# Patient Record
Sex: MALE | Race: WHITE | NOT HISPANIC OR LATINO | Employment: FULL TIME | URBAN - METROPOLITAN AREA
[De-identification: names, ages, dates, MRNs, and addresses within clinical notes are randomized per-mention and may not be internally consistent; named-entity substitution may affect disease eponyms.]

---

## 2023-01-01 ENCOUNTER — OFFICE VISIT (OUTPATIENT)
Dept: URGENT CARE | Facility: URGENT CARE | Age: 28
End: 2023-01-01
Payer: COMMERCIAL

## 2023-01-01 VITALS
DIASTOLIC BLOOD PRESSURE: 82 MMHG | SYSTOLIC BLOOD PRESSURE: 119 MMHG | HEART RATE: 86 BPM | OXYGEN SATURATION: 97 % | TEMPERATURE: 99.3 F

## 2023-01-01 DIAGNOSIS — H83.02 ACUTE LABYRINTHITIS, LEFT: Primary | ICD-10-CM

## 2023-01-01 DIAGNOSIS — H90.42 SENSORINEURAL HEARING LOSS (SNHL) OF LEFT EAR WITH UNRESTRICTED HEARING OF RIGHT EAR: ICD-10-CM

## 2023-01-01 LAB — B BURGDOR IGG+IGM SER QL: 0.06

## 2023-01-01 PROCEDURE — 86618 LYME DISEASE ANTIBODY: CPT | Performed by: INTERNAL MEDICINE

## 2023-01-01 PROCEDURE — 99203 OFFICE O/P NEW LOW 30 MIN: CPT | Performed by: INTERNAL MEDICINE

## 2023-01-01 PROCEDURE — 36415 COLL VENOUS BLD VENIPUNCTURE: CPT | Performed by: INTERNAL MEDICINE

## 2023-01-01 ASSESSMENT — PAIN SCALES - GENERAL: PAINLEVEL: NO PAIN (0)

## 2023-08-21 NOTE — PATIENT INSTRUCTIONS
Your signs and symptoms fit well with a condition called acute labyrinthitis or vestibular neuronitis which is a viral infection of the inner ear.  Most of the time, this will gradually improve over the next several weeks.    There is some possibility of secondary Lyme disease causing a cranial nerve palsy (8th cranial nerve) so we will do an antibody test for that.  A negative result is helpful for ruling out Lyme disease.  A positive result would be meaningful only if your symptoms are not continuing to improve.    If you are not significantly better in the coming week, come back for reassessment and potential referral to an ENT specialist.

## 2023-08-21 NOTE — LETTER
August 21, 2023      Barry Linares  [address]  [address]  [place] [address]        To Whom It May Concern:    I have seen Barry JO TorresMilagros at the Urgent Care for an acute episode of vertigo and hearing loss.  Please excuse absence from work over the coming week.  Thank you.      Sincerely,        Duke Sargent MD

## 2023-08-21 NOTE — PROGRESS NOTES
Assessment & Plan     Acute labyrinthitis, left  Patient Instructions   Your signs and symptoms fit well with a condition called acute labyrinthitis or vestibular neuronitis which is a viral infection of the inner ear.  Most of the time, this will gradually improve over the next several weeks.    There is some possibility of secondary Lyme disease causing a cranial nerve palsy (8th cranial nerve) so we will do an antibody test for that.  A negative result is helpful for ruling out Lyme disease.  A positive result would be meaningful only if your symptoms are not continuing to improve.    If you are not significantly better in the coming week, come back for reassessment and potential referral to an ENT specialist.   - Lyme Disease Total Abs Bld with Reflex to Confirm CLIA; Future  - Lyme Disease Total Abs Bld with Reflex to Confirm CLIA    Sensorineural hearing loss (SNHL) of left ear with unrestricted hearing of right ear  - Lyme Disease Total Abs Bld with Reflex to Confirm CLIA; Future  - Lyme Disease Total Abs Bld with Reflex to Confirm CLI    Return in about 1 week (around 8/28/2023), or if symptoms worsen or fail to improve.    Duke Sargent MD  [facility] APRYL Reddy is a 28 year old, presenting for the following health issues:  Urgent Care (Pt states that he has had left ear hearing loss, vertigo, nausea, vomiting for 3 days, head feels wobbly. Pt on current medications that need to be put into med list here.)    HPI   Chief complaint of vertigo.  This happened acutely a few days ago.  Associated nausea, vomiting.  Was prescribed ondansetron and meclizine.  Continues to experience some vertigo with superior gaze or head movement.  Can suppress the vertigo with staying still.  He notes hearing loss on the left side. Denies ear pain on the left side.  Denies ear pressure.  Noting some tinnitus -- he does have some tendency to tinnitus so can't tell if the current tinnitus  is distinct from baseline.  He did have some intense tinnitus initially but this has resolved.     Review of Systems   Constitutional, HEENT, cardiovascular, pulmonary, gi and gu systems are negative, except as otherwise noted.      Objective    /82 (BP Location: Right arm, Patient Position: Sitting, Cuff Size: Adult Large)   Pulse 86   Temp 99.3  F (37.4  C) (Oral)   SpO2 97%   There is no height or weight on file to calculate BMI.  Physical Exam   GENERAL APPEARANCE: alert and no distress  EYES: Eyes grossly normal to inspection, PERRL, and mild nystagmus with left-directed lateral gaze; no vertical nystagmus  HENT: ear canals and TM's normal and nose and mouth without ulcers or lesions  NECK: no adenopathy and no asymmetry, masses, or scars  NEURO: CN 2-12 intact with the exception of left CN 8 that shows diminished hearing on the left side; Doshi lateralizes to the contralateral ear and Rinne shows AC > BC

## 2024-01-01 ENCOUNTER — APPOINTMENT (OUTPATIENT)
Dept: CT IMAGING | Facility: CLINIC | Age: 29
End: 2024-01-01
Attending: EMERGENCY MEDICINE
Payer: COMMERCIAL

## 2024-01-01 ENCOUNTER — OFFICE VISIT (OUTPATIENT)
Dept: URGENT CARE | Facility: URGENT CARE | Age: 29
End: 2024-01-01
Payer: COMMERCIAL

## 2024-01-01 ENCOUNTER — HOSPITAL ENCOUNTER (EMERGENCY)
Facility: CLINIC | Age: 29
Discharge: HOME | End: 2024-07-16
Attending: EMERGENCY MEDICINE | Admitting: EMERGENCY MEDICINE
Payer: COMMERCIAL

## 2024-01-01 ENCOUNTER — APPOINTMENT (OUTPATIENT)
Dept: GENERAL RADIOLOGY | Facility: CLINIC | Age: 29
End: 2024-01-01
Attending: EMERGENCY MEDICINE
Payer: COMMERCIAL

## 2024-01-01 ENCOUNTER — HOSPITAL ENCOUNTER (EMERGENCY)
Facility: CLINIC | Age: 29
Discharge: HOME | End: 2024-07-27
Attending: EMERGENCY MEDICINE | Admitting: EMERGENCY MEDICINE
Payer: COMMERCIAL

## 2024-01-01 VITALS
TEMPERATURE: 97.6 F | BODY MASS INDEX: 39.07 KG/M2 | HEART RATE: 115 BPM | HEIGHT: 67 IN | OXYGEN SATURATION: 98 % | RESPIRATION RATE: 18 BRPM | DIASTOLIC BLOOD PRESSURE: 90 MMHG | WEIGHT: 248.9 LBS | SYSTOLIC BLOOD PRESSURE: 137 MMHG

## 2024-01-01 VITALS
SYSTOLIC BLOOD PRESSURE: 121 MMHG | HEART RATE: 123 BPM | TEMPERATURE: 103.3 F | WEIGHT: 258.3 LBS | DIASTOLIC BLOOD PRESSURE: 83 MMHG | OXYGEN SATURATION: 98 %

## 2024-01-01 VITALS
OXYGEN SATURATION: 96 % | HEART RATE: 89 BPM | BODY MASS INDEX: 40.8 KG/M2 | DIASTOLIC BLOOD PRESSURE: 112 MMHG | TEMPERATURE: 100 F | SYSTOLIC BLOOD PRESSURE: 124 MMHG | WEIGHT: 259.92 LBS | HEIGHT: 67 IN | RESPIRATION RATE: 20 BRPM

## 2024-01-01 DIAGNOSIS — K29.80 DUODENITIS: ICD-10-CM

## 2024-01-01 DIAGNOSIS — R51.9 NONINTRACTABLE HEADACHE, UNSPECIFIED CHRONICITY PATTERN, UNSPECIFIED HEADACHE TYPE: ICD-10-CM

## 2024-01-01 DIAGNOSIS — R50.9 FEVER AND CHILLS: Primary | ICD-10-CM

## 2024-01-01 DIAGNOSIS — A87.9 VIRAL MENINGITIS: ICD-10-CM

## 2024-01-01 DIAGNOSIS — T39.395A NSAID INDUCED GASTRITIS: ICD-10-CM

## 2024-01-01 DIAGNOSIS — K29.60 NSAID INDUCED GASTRITIS: ICD-10-CM

## 2024-01-01 DIAGNOSIS — M79.10 MYALGIA: ICD-10-CM

## 2024-01-01 LAB
ALBUMIN SERPL BCG-MCNC: 4.4 G/DL (ref 3.5–5.2)
ALBUMIN SERPL BCG-MCNC: 4.6 G/DL (ref 3.5–5.2)
ALBUMIN UR-MCNC: 70 MG/DL
ALP SERPL-CCNC: 119 U/L (ref 40–150)
ALP SERPL-CCNC: 154 U/L (ref 40–150)
ALT SERPL W P-5'-P-CCNC: 39 U/L (ref 0–70)
ALT SERPL W P-5'-P-CCNC: 65 U/L (ref 0–70)
ANION GAP SERPL CALCULATED.3IONS-SCNC: 14 MMOL/L (ref 7–15)
ANION GAP SERPL CALCULATED.3IONS-SCNC: 18 MMOL/L (ref 3–14)
ANION GAP SERPL CALCULATED.3IONS-SCNC: 18 MMOL/L (ref 7–15)
APPEARANCE CSF: CLEAR
APPEARANCE UR: CLEAR
AST SERPL W P-5'-P-CCNC: 28 U/L (ref 0–45)
AST SERPL W P-5'-P-CCNC: 55 U/L (ref 0–45)
B BURGDOR IGG CSF QL IB: NEGATIVE
B BURGDOR IGG+IGM SER QL: 0.16
B BURGDOR IGM CSF QL IB: NEGATIVE
BACTERIA BLD CULT: NO GROWTH
BACTERIA BLD CULT: NO GROWTH
BACTERIA CSF CULT: NO GROWTH
BACTERIA CSF CULT: NORMAL
BASOPHILS # BLD AUTO: 0 10E3/UL (ref 0–0.2)
BASOPHILS # BLD AUTO: 0.1 10E3/UL (ref 0–0.2)
BASOPHILS # BLD AUTO: 0.1 10E3/UL (ref 0–0.2)
BASOPHILS NFR BLD AUTO: 0 %
BASOPHILS NFR BLD AUTO: 1 %
BASOPHILS NFR BLD AUTO: 1 %
BILIRUB DIRECT SERPL-MCNC: <0.2 MG/DL (ref 0–0.3)
BILIRUB SERPL-MCNC: 0.4 MG/DL
BILIRUB SERPL-MCNC: 0.8 MG/DL
BILIRUB UR QL STRIP: NEGATIVE
BUN SERPL-MCNC: 10.8 MG/DL (ref 6–20)
BUN SERPL-MCNC: 14.5 MG/DL (ref 6–20)
BUN SERPL-MCNC: 15 MG/DL (ref 7–30)
C GATTII+NEOFOR DNA CSF QL NAA+NON-PROBE: NEGATIVE
CALCIUM SERPL-MCNC: 10.1 MG/DL (ref 8.8–10.4)
CALCIUM SERPL-MCNC: 9.7 MG/DL (ref 8.5–10.1)
CALCIUM SERPL-MCNC: 9.7 MG/DL (ref 8.6–10)
CHLORIDE BLD-SCNC: 93 MMOL/L (ref 94–109)
CHLORIDE SERPL-SCNC: 93 MMOL/L (ref 98–107)
CHLORIDE SERPL-SCNC: 99 MMOL/L (ref 98–107)
CMV DNA CSF QL NAA+NON-PROBE: NEGATIVE
CO2 SERPL-SCNC: 20 MMOL/L (ref 20–32)
COLOR CSF: COLORLESS
COLOR UR AUTO: ABNORMAL
CREAT SERPL-MCNC: 1.16 MG/DL (ref 0.67–1.17)
CREAT SERPL-MCNC: 1.22 MG/DL (ref 0.66–1.25)
CREAT SERPL-MCNC: 1.22 MG/DL (ref 0.67–1.17)
E COLI K1 AG CSF QL: NEGATIVE
EGFRCR SERPLBLD CKD-EPI 2021: 81 ML/MIN/1.73M2
EGFRCR SERPLBLD CKD-EPI 2021: 81 ML/MIN/1.73M2
EGFRCR SERPLBLD CKD-EPI 2021: 86 ML/MIN/1.73M2
EOSINOPHIL # BLD AUTO: 0 10E3/UL (ref 0–0.7)
EOSINOPHIL # BLD AUTO: 0 10E3/UL (ref 0–0.7)
EOSINOPHIL # BLD AUTO: 0.1 10E3/UL (ref 0–0.7)
EOSINOPHIL NFR BLD AUTO: 0 %
EOSINOPHIL NFR BLD AUTO: 0 %
EOSINOPHIL NFR BLD AUTO: 1 %
ERYTHROCYTE [DISTWIDTH] IN BLOOD BY AUTOMATED COUNT: 13.9 % (ref 10–15)
ERYTHROCYTE [DISTWIDTH] IN BLOOD BY AUTOMATED COUNT: 14.1 % (ref 10–15)
ERYTHROCYTE [DISTWIDTH] IN BLOOD BY AUTOMATED COUNT: 14.2 % (ref 10–15)
EV RNA SPEC QL NAA+PROBE: NEGATIVE
FLUAV AG SPEC QL IA: NEGATIVE
FLUAV RNA SPEC QL NAA+PROBE: NEGATIVE
FLUBV AG SPEC QL IA: NEGATIVE
FLUBV RNA RESP QL NAA+PROBE: NEGATIVE
GLUCOSE BLD-MCNC: 96 MG/DL (ref 70–99)
GLUCOSE CSF-MCNC: 59 MG/DL (ref 40–70)
GLUCOSE SERPL-MCNC: 106 MG/DL (ref 70–99)
GLUCOSE SERPL-MCNC: 96 MG/DL (ref 70–99)
GLUCOSE UR STRIP-MCNC: 30 MG/DL
GP B STREP DNA CSF QL NAA+NON-PROBE: NEGATIVE
GRAM STAIN RESULT: NORMAL
GRAM STAIN RESULT: NORMAL
GROUP A STREP BY PCR: NOT DETECTED
HAEM INFLU DNA CSF QL NAA+NON-PROBE: NEGATIVE
HCO3 SERPL-SCNC: 20 MMOL/L (ref 22–29)
HCO3 SERPL-SCNC: 23 MMOL/L (ref 22–29)
HCT VFR BLD AUTO: 51 % (ref 40–53)
HCT VFR BLD AUTO: 52.6 % (ref 40–53)
HCT VFR BLD AUTO: 54.2 % (ref 40–53)
HGB BLD-MCNC: 16.8 G/DL (ref 13.3–17.7)
HGB BLD-MCNC: 17.3 G/DL (ref 13.3–17.7)
HGB BLD-MCNC: 17.9 G/DL (ref 13.3–17.7)
HGB UR QL STRIP: ABNORMAL
HHV6 DNA CSF QL NAA+NON-PROBE: POSITIVE
HOLD SPECIMEN: NORMAL
HSV1 DNA CSF QL NAA+NON-PROBE: NEGATIVE
HSV2 DNA CSF QL NAA+NON-PROBE: NEGATIVE
IMM GRANULOCYTES # BLD: 0.1 10E3/UL
IMM GRANULOCYTES NFR BLD: 1 %
KETONES UR STRIP-MCNC: 40 MG/DL
L MONOCYTOG DNA CSF QL NAA+NON-PROBE: NEGATIVE
LACTATE SERPL-SCNC: 1.7 MMOL/L (ref 0.7–2)
LEUKOCYTE ESTERASE UR QL STRIP: NEGATIVE
LIPASE SERPL-CCNC: 26 U/L (ref 13–60)
LYMPH ABN NFR CSF MANUAL: 76 %
LYMPHOCYTES # BLD AUTO: 0.9 10E3/UL (ref 0.8–5.3)
LYMPHOCYTES # BLD AUTO: 1.2 10E3/UL (ref 0.8–5.3)
LYMPHOCYTES # BLD AUTO: 3.1 10E3/UL (ref 0.8–5.3)
LYMPHOCYTES NFR BLD AUTO: 11 %
LYMPHOCYTES NFR BLD AUTO: 16 %
LYMPHOCYTES NFR BLD AUTO: 20 %
MCH RBC QN AUTO: 29.6 PG (ref 26.5–33)
MCH RBC QN AUTO: 29.8 PG (ref 26.5–33)
MCH RBC QN AUTO: 29.9 PG (ref 26.5–33)
MCHC RBC AUTO-ENTMCNC: 32.9 G/DL (ref 31.5–36.5)
MCHC RBC AUTO-ENTMCNC: 32.9 G/DL (ref 31.5–36.5)
MCHC RBC AUTO-ENTMCNC: 33 G/DL (ref 31.5–36.5)
MCV RBC AUTO: 90 FL (ref 78–100)
MCV RBC AUTO: 90 FL (ref 78–100)
MCV RBC AUTO: 91 FL (ref 78–100)
MONOCYTES # BLD AUTO: 0.5 10E3/UL (ref 0–1.3)
MONOCYTES # BLD AUTO: 0.8 10E3/UL (ref 0–1.3)
MONOCYTES # BLD AUTO: 1.4 10E3/UL (ref 0–1.3)
MONOCYTES NFR BLD AUTO: 7 %
MONOCYTES NFR BLD AUTO: 9 %
MONOCYTES NFR BLD AUTO: 9 %
MONOS+MACROS NFR CSF MANUAL: 11 %
MUCOUS THREADS #/AREA URNS LPF: PRESENT /LPF
N MEN DNA CSF QL NAA+NON-PROBE: NEGATIVE
NEUTROPHILS # BLD AUTO: 10.3 10E3/UL (ref 1.6–8.3)
NEUTROPHILS # BLD AUTO: 5.6 10E3/UL (ref 1.6–8.3)
NEUTROPHILS # BLD AUTO: 6.8 10E3/UL (ref 1.6–8.3)
NEUTROPHILS NFR BLD AUTO: 69 %
NEUTROPHILS NFR BLD AUTO: 75 %
NEUTROPHILS NFR BLD AUTO: 80 %
NEUTROPHILS NFR CSF MANUAL: 13 %
NITRATE UR QL: NEGATIVE
NRBC # BLD AUTO: 0 10E3/UL
NRBC # BLD AUTO: 0 10E3/UL
NRBC BLD AUTO-RTO: 0 /100
NRBC BLD AUTO-RTO: 0 /100
PARECHOVIRUS A RNA CSF QL NAA+NON-PROBE: NEGATIVE
PH UR STRIP: 6.5 [PH] (ref 5–7)
PLAT MORPH BLD: NORMAL
PLATELET # BLD AUTO: 265 10E3/UL (ref 150–450)
PLATELET # BLD AUTO: 282 10E3/UL (ref 150–450)
PLATELET # BLD AUTO: 556 10E3/UL (ref 150–450)
POTASSIUM BLD-SCNC: 3.9 MMOL/L (ref 3.4–5.3)
POTASSIUM SERPL-SCNC: 3.9 MMOL/L (ref 3.4–5.3)
POTASSIUM SERPL-SCNC: 4.2 MMOL/L (ref 3.4–5.3)
PROT CSF-MCNC: 20.4 MG/DL (ref 15–45)
PROT SERPL-MCNC: 8.2 G/DL (ref 6.4–8.3)
PROT SERPL-MCNC: 8.2 G/DL (ref 6.4–8.3)
RBC # BLD AUTO: 5.67 10E6/UL (ref 4.4–5.9)
RBC # BLD AUTO: 5.78 10E6/UL (ref 4.4–5.9)
RBC # BLD AUTO: 6.01 10E6/UL (ref 4.4–5.9)
RBC # CSF MANUAL: 2 /UL (ref 0–2)
RBC MORPH BLD: NORMAL
RBC URINE: 4 /HPF
RSV RNA SPEC NAA+PROBE: NEGATIVE
S PNEUM DNA CSF QL NAA+NON-PROBE: NEGATIVE
SARS-COV-2 RNA RESP QL NAA+PROBE: NEGATIVE
SODIUM SERPL-SCNC: 131 MMOL/L (ref 135–145)
SODIUM SERPL-SCNC: 131 MMOL/L (ref 135–145)
SODIUM SERPL-SCNC: 136 MMOL/L (ref 135–145)
SP GR UR STRIP: 1.01 (ref 1–1.03)
SQUAMOUS EPITHELIAL: <1 /HPF
TUBE # CSF: 4
UROBILINOGEN UR STRIP-MCNC: NORMAL MG/DL
VZV DNA CSF QL NAA+NON-PROBE: NEGATIVE
WBC # BLD AUTO: 15 10E3/UL (ref 4–11)
WBC # BLD AUTO: 7.5 10E3/UL (ref 4–11)
WBC # BLD AUTO: 8.6 10E3/UL (ref 4–11)
WBC # CSF MANUAL: 19 /UL (ref 0–5)
WBC URINE: 2 /HPF

## 2024-01-01 PROCEDURE — 36415 COLL VENOUS BLD VENIPUNCTURE: CPT | Performed by: EMERGENCY MEDICINE

## 2024-01-01 PROCEDURE — 250N000013 HC RX MED GY IP 250 OP 250 PS 637: Performed by: EMERGENCY MEDICINE

## 2024-01-01 PROCEDURE — 99214 OFFICE O/P EST MOD 30 MIN: CPT | Performed by: FAMILY MEDICINE

## 2024-01-01 PROCEDURE — 96361 HYDRATE IV INFUSION ADD-ON: CPT

## 2024-01-01 PROCEDURE — 36415 COLL VENOUS BLD VENIPUNCTURE: CPT | Performed by: FAMILY MEDICINE

## 2024-01-01 PROCEDURE — 84157 ASSAY OF PROTEIN OTHER: CPT | Performed by: EMERGENCY MEDICINE

## 2024-01-01 PROCEDURE — 250N000011 HC RX IP 250 OP 636: Performed by: EMERGENCY MEDICINE

## 2024-01-01 PROCEDURE — 74177 CT ABD & PELVIS W/CONTRAST: CPT

## 2024-01-01 PROCEDURE — 85025 COMPLETE CBC W/AUTO DIFF WBC: CPT | Performed by: FAMILY MEDICINE

## 2024-01-01 PROCEDURE — 36415 COLL VENOUS BLD VENIPUNCTURE: CPT | Performed by: STUDENT IN AN ORGANIZED HEALTH CARE EDUCATION/TRAINING PROGRAM

## 2024-01-01 PROCEDURE — 80053 COMPREHEN METABOLIC PANEL: CPT | Performed by: EMERGENCY MEDICINE

## 2024-01-01 PROCEDURE — 99285 EMERGENCY DEPT VISIT HI MDM: CPT | Mod: 25

## 2024-01-01 PROCEDURE — 96374 THER/PROPH/DIAG INJ IV PUSH: CPT | Mod: 59

## 2024-01-01 PROCEDURE — 87651 STREP A DNA AMP PROBE: CPT | Performed by: EMERGENCY MEDICINE

## 2024-01-01 PROCEDURE — 87070 CULTURE OTHR SPECIMN AEROBIC: CPT | Mod: XS | Performed by: EMERGENCY MEDICINE

## 2024-01-01 PROCEDURE — 87483 CNS DNA AMP PROBE TYPE 12-25: CPT | Performed by: EMERGENCY MEDICINE

## 2024-01-01 PROCEDURE — 82248 BILIRUBIN DIRECT: CPT | Performed by: EMERGENCY MEDICINE

## 2024-01-01 PROCEDURE — 83690 ASSAY OF LIPASE: CPT | Performed by: EMERGENCY MEDICINE

## 2024-01-01 PROCEDURE — 96375 TX/PRO/DX INJ NEW DRUG ADDON: CPT

## 2024-01-01 PROCEDURE — 87075 CULTR BACTERIA EXCEPT BLOOD: CPT | Mod: XS | Performed by: EMERGENCY MEDICINE

## 2024-01-01 PROCEDURE — 250N000013 HC RX MED GY IP 250 OP 250 PS 637: Performed by: STUDENT IN AN ORGANIZED HEALTH CARE EDUCATION/TRAINING PROGRAM

## 2024-01-01 PROCEDURE — 85025 COMPLETE CBC W/AUTO DIFF WBC: CPT | Performed by: EMERGENCY MEDICINE

## 2024-01-01 PROCEDURE — 86617 LYME DISEASE ANTIBODY: CPT | Performed by: EMERGENCY MEDICINE

## 2024-01-01 PROCEDURE — 258N000003 HC RX IP 258 OP 636: Performed by: EMERGENCY MEDICINE

## 2024-01-01 PROCEDURE — 70450 CT HEAD/BRAIN W/O DYE: CPT

## 2024-01-01 PROCEDURE — 80048 BASIC METABOLIC PNL TOTAL CA: CPT | Performed by: EMERGENCY MEDICINE

## 2024-01-01 PROCEDURE — 99291 CRITICAL CARE FIRST HOUR: CPT | Mod: 25

## 2024-01-01 PROCEDURE — 87205 SMEAR GRAM STAIN: CPT | Performed by: EMERGENCY MEDICINE

## 2024-01-01 PROCEDURE — 87804 INFLUENZA ASSAY W/OPTIC: CPT | Performed by: FAMILY MEDICINE

## 2024-01-01 PROCEDURE — 82945 GLUCOSE OTHER FLUID: CPT | Performed by: EMERGENCY MEDICINE

## 2024-01-01 PROCEDURE — 71046 X-RAY EXAM CHEST 2 VIEWS: CPT

## 2024-01-01 PROCEDURE — 87637 SARSCOV2&INF A&B&RSV AMP PRB: CPT | Performed by: EMERGENCY MEDICINE

## 2024-01-01 PROCEDURE — 86618 LYME DISEASE ANTIBODY: CPT | Performed by: EMERGENCY MEDICINE

## 2024-01-01 PROCEDURE — 62270 DX LMBR SPI PNXR: CPT

## 2024-01-01 PROCEDURE — 89051 BODY FLUID CELL COUNT: CPT | Performed by: EMERGENCY MEDICINE

## 2024-01-01 PROCEDURE — 83605 ASSAY OF LACTIC ACID: CPT | Performed by: EMERGENCY MEDICINE

## 2024-01-01 PROCEDURE — 81001 URINALYSIS AUTO W/SCOPE: CPT | Performed by: EMERGENCY MEDICINE

## 2024-01-01 PROCEDURE — 87040 BLOOD CULTURE FOR BACTERIA: CPT | Mod: XS | Performed by: EMERGENCY MEDICINE

## 2024-01-01 PROCEDURE — 80048 BASIC METABOLIC PNL TOTAL CA: CPT | Performed by: FAMILY MEDICINE

## 2024-01-01 PROCEDURE — 96360 HYDRATION IV INFUSION INIT: CPT

## 2024-01-01 PROCEDURE — 83605 ASSAY OF LACTIC ACID: CPT | Performed by: STUDENT IN AN ORGANIZED HEALTH CARE EDUCATION/TRAINING PROGRAM

## 2024-01-01 RX ORDER — PANTOPRAZOLE SODIUM 40 MG/1
40 TABLET, DELAYED RELEASE ORAL DAILY
Qty: 30 TABLET | Refills: 0 | Status: SHIPPED | OUTPATIENT
Start: 2024-01-01

## 2024-01-01 RX ORDER — ONDANSETRON 2 MG/ML
4 INJECTION INTRAMUSCULAR; INTRAVENOUS EVERY 30 MIN PRN
Status: DISCONTINUED | OUTPATIENT
Start: 2024-01-01 | End: 2024-01-01 | Stop reason: HOSPADM

## 2024-01-01 RX ORDER — MOLNUPIRAVIR 200 MG/1
CAPSULE ORAL
COMMUNITY
Start: 2023-01-01

## 2024-01-01 RX ORDER — ACETAMINOPHEN 500 MG
1000 TABLET ORAL ONCE
Status: COMPLETED | OUTPATIENT
Start: 2024-01-01 | End: 2024-01-01

## 2024-01-01 RX ORDER — SUCRALFATE ORAL 1 G/10ML
1 SUSPENSION ORAL 4 TIMES DAILY
Qty: 420 ML | Refills: 0 | Status: SHIPPED | OUTPATIENT
Start: 2024-01-01

## 2024-01-01 RX ORDER — TESTOSTERONE CYPIONATE 200 MG/ML
INJECTION, SOLUTION INTRAMUSCULAR
COMMUNITY
Start: 2024-01-01

## 2024-01-01 RX ORDER — CHOLECALCIFEROL (VITAMIN D3) 10(400)/ML
DROPS ORAL
COMMUNITY

## 2024-01-01 RX ORDER — IOPAMIDOL 755 MG/ML
500 INJECTION, SOLUTION INTRAVASCULAR ONCE
Status: COMPLETED | OUTPATIENT
Start: 2024-01-01 | End: 2024-01-01

## 2024-01-01 RX ORDER — BUPROPION HYDROCHLORIDE 150 MG/1
TABLET ORAL
COMMUNITY
Start: 2024-01-01

## 2024-01-01 RX ORDER — ACETAMINOPHEN 500 MG
500 TABLET ORAL ONCE
Status: COMPLETED | OUTPATIENT
Start: 2024-01-01 | End: 2024-01-01

## 2024-01-01 RX ORDER — IBUPROFEN 200 MG
400 TABLET ORAL ONCE
Status: COMPLETED | OUTPATIENT
Start: 2024-01-01 | End: 2024-01-01

## 2024-01-01 RX ORDER — ONDANSETRON 4 MG/1
4 TABLET, ORALLY DISINTEGRATING ORAL EVERY 8 HOURS PRN
Qty: 10 TABLET | Refills: 0 | Status: SHIPPED | OUTPATIENT
Start: 2024-01-01 | End: 2024-01-01

## 2024-01-01 RX ADMIN — IOPAMIDOL 100 ML: 755 INJECTION, SOLUTION INTRAVENOUS at 01:51

## 2024-01-01 RX ADMIN — PANTOPRAZOLE SODIUM 40 MG: 40 INJECTION, POWDER, FOR SOLUTION INTRAVENOUS at 03:09

## 2024-01-01 RX ADMIN — IBUPROFEN 400 MG: 200 TABLET, FILM COATED ORAL at 16:22

## 2024-01-01 RX ADMIN — SODIUM CHLORIDE 1000 ML: 9 INJECTION, SOLUTION INTRAVENOUS at 03:01

## 2024-01-01 RX ADMIN — Medication 500 MG: at 15:31

## 2024-01-01 RX ADMIN — SODIUM CHLORIDE 1000 ML: 9 INJECTION, SOLUTION INTRAVENOUS at 21:37

## 2024-01-01 RX ADMIN — SODIUM CHLORIDE 3537 ML: 9 INJECTION, SOLUTION INTRAVENOUS at 18:48

## 2024-01-01 RX ADMIN — ONDANSETRON 4 MG: 2 INJECTION INTRAMUSCULAR; INTRAVENOUS at 03:08

## 2024-01-01 RX ADMIN — ACETAMINOPHEN 1000 MG: 500 TABLET, FILM COATED ORAL at 21:37

## 2024-01-01 ASSESSMENT — COLUMBIA-SUICIDE SEVERITY RATING SCALE - C-SSRS
1. IN THE PAST MONTH, HAVE YOU WISHED YOU WERE DEAD OR WISHED YOU COULD GO TO SLEEP AND NOT WAKE UP?: NO
6. HAVE YOU EVER DONE ANYTHING, STARTED TO DO ANYTHING, OR PREPARED TO DO ANYTHING TO END YOUR LIFE?: NO
6. HAVE YOU EVER DONE ANYTHING, STARTED TO DO ANYTHING, OR PREPARED TO DO ANYTHING TO END YOUR LIFE?: NO
2. HAVE YOU ACTUALLY HAD ANY THOUGHTS OF KILLING YOURSELF IN THE PAST MONTH?: NO
1. IN THE PAST MONTH, HAVE YOU WISHED YOU WERE DEAD OR WISHED YOU COULD GO TO SLEEP AND NOT WAKE UP?: NO
2. HAVE YOU ACTUALLY HAD ANY THOUGHTS OF KILLING YOURSELF IN THE PAST MONTH?: NO

## 2024-01-01 ASSESSMENT — ACTIVITIES OF DAILY LIVING (ADL)
ADLS_ACUITY_SCORE: 35
ADLS_ACUITY_SCORE: 33
ADLS_ACUITY_SCORE: 35
ADLS_ACUITY_SCORE: 33
ADLS_ACUITY_SCORE: 33
ADLS_ACUITY_SCORE: 35

## 2024-07-15 NOTE — PATIENT INSTRUCTIONS
[facility] Emergency Department      Address: [address], [place] [address]    Phone: [phone]

## 2024-07-15 NOTE — ED TRIAGE NOTES
Patient ambulatory to triage after being referred here for spinal tap/meningitis workup. Patient seen at  in [place] for headache, fever, neck pain for 4 days. Tylenol last given at 1530.

## 2024-07-15 NOTE — ED PROVIDER NOTES
Emergency Department Note      History of Present Illness     Chief Complaint   Headache      HPI   Barry Linares is a 29 year old male with a history of Hodgkin's lymphoma who presents to the ED from urgent care for evaluation of a headache. The patient states he developed a mild headache 5 days ago that has been constant, waxing and waning in severity, and worsening with time. Four days ago, he noted a fever of 101F, diaphoresis, and intermittent chills that have been also worsening with time. States he tested negative for COVID and Influenza A and B in clinic 3 days ago. Today he also has neck and back pain as well as nausea when the headache is severe. No vomiting. He has been attempting to treat his symptoms with Tylenol and ibuprofen with no relief. He was seen in urgent care today and was sent here for possible meningitis workup. Notes he takes testosterone intramuscularly once per week. Reports a history of Hodgkin's lymphoma, in remission for over 10 years, with multiple cervical and groin surgeries and biopsies done. Denies cough, rhinorrhea, pharyngitis, dysuria, hematuria, rash, abdominal pain, or diarrhea.  No chest pain.  No pleuritic symptoms.    Review of External Notes   Urgent care notes reviewed from earlier today when the patient was seen for febrile illness and referred to the ED for further workup.    Past Medical History     Medical History and Problem List   Depression  Anxiety  Gender dysphoria  Hodgkin's lymphoma    Medications   Wellbutrin  Meclizine  Ondansetron  Lagevrio  Depotestosterone     Surgical History   Appendectomy  Herniorrhaphy  Hysterectomy  Lymph node biopsy  Bone marrow transplant     Physical Exam     Patient Vitals for the past 24 hrs:   BP Temp Temp src Pulse Resp SpO2 Height Weight   07/15/24 2227 -- -- -- -- 20 98 % -- --   07/15/24 2226 -- -- -- 103 21 98 % -- --   07/15/24 2225 -- -- -- 102 22 98 % -- --   07/15/24 2223 -- -- -- 101 22 98 % -- --   07/15/24 2222 --  "-- -- 104 28 98 % -- --   07/15/24 2221 -- -- -- 99 26 96 % -- --   07/15/24 2220 -- -- -- 99 25 97 % -- --   07/15/24 2219 -- -- -- 99 24 97 % -- --   07/15/24 2202 -- -- -- 98 13 98 % -- --   07/15/24 2200 (!) 144/90 -- -- 98 -- 96 % -- --   07/15/24 2132 -- -- -- 99 17 95 % -- --   07/15/24 2117 -- -- -- 112 28 98 % -- --   07/15/24 2102 (!) 142/84 -- -- 105 27 97 % -- --   07/15/24 2047 -- -- -- 102 26 98 % -- --   07/15/24 2032 (!) 142/91 -- -- 104 24 98 % -- --   07/15/24 1900 (!) 144/92 -- -- 101 23 99 % -- --   07/15/24 1858 -- 100  F (37.8  C) Oral -- -- -- -- --   07/15/24 1841 -- -- -- 103 29 -- -- --   07/15/24 1831 -- -- -- 101 -- -- -- --   07/15/24 1821 -- -- -- 103 22 -- -- --   07/15/24 1818 -- -- -- 105 -- 96 % -- --   07/15/24 1817 -- -- -- 107 23 97 % -- --   07/15/24 1816 -- -- -- 107 18 96 % -- --   07/15/24 1815 -- -- -- 108 -- 97 % -- --   07/15/24 1813 (!) 140/113 -- -- 108 30 97 % -- --   07/15/24 1620 (!) 142/92 (!) 102.8  F (39.3  C) -- (!) 124 16 97 % 1.702 m (5' 7\") 117.9 kg (259 lb 14.8 oz)     Physical Exam  Constitutional:       General: He is not in acute distress.     Appearance: Normal appearance. He is not toxic-appearing.   HENT:      Head: Atraumatic.      Right Ear: Tympanic membrane, ear canal and external ear normal.      Left Ear: Tympanic membrane, ear canal and external ear normal.      Nose: Nose normal.      Mouth/Throat:      Pharynx: No oropharyngeal exudate.      Comments: Mild erythema.  No swelling.  Uvula midline.  No evidence of retropharyngeal or peritonsillar abscess.  Eyes:      General: No scleral icterus.     Conjunctiva/sclera: Conjunctivae normal.   Cardiovascular:      Rate and Rhythm: Normal rate.      Heart sounds: Normal heart sounds.   Pulmonary:      Effort: Pulmonary effort is normal. No respiratory distress.      Breath sounds: Normal breath sounds.   Abdominal:      Palpations: Abdomen is soft.      Tenderness: There is no abdominal tenderness. "   Musculoskeletal:         General: No deformity.      Cervical back: Neck supple. No rigidity.      Right lower leg: No edema.      Left lower leg: No edema.   Skin:     General: Skin is warm.      Capillary Refill: Capillary refill takes less than 2 seconds.      Findings: No rash.   Neurological:      General: No focal deficit present.      Mental Status: He is alert and oriented to person, place, and time.   Psychiatric:         Mood and Affect: Mood normal.         Behavior: Behavior normal.           Diagnostics     Lab Results   Labs Ordered and Resulted from Time of ED Arrival to Time of ED Departure   COMPREHENSIVE METABOLIC PANEL - Abnormal       Result Value    Sodium 131 (*)     Potassium 3.9      Carbon Dioxide (CO2) 20 (*)     Anion Gap 18 (*)     Urea Nitrogen 14.5      Creatinine 1.22 (*)     GFR Estimate 81      Calcium 9.7      Chloride 93 (*)     Glucose 96      Alkaline Phosphatase 154 (*)     AST 55 (*)     ALT 65      Protein Total 8.2      Albumin 4.4      Bilirubin Total 0.4     CBC WITH PLATELETS AND DIFFERENTIAL - Abnormal    WBC Count 7.5      RBC Count 6.01 (*)     Hemoglobin 17.9 (*)     Hematocrit 54.2 (*)     MCV 90      MCH 29.8      MCHC 33.0      RDW 14.2      Platelet Count 282      % Neutrophils 75      % Lymphocytes 16      % Monocytes 7      % Eosinophils 0      % Basophils 1      % Immature Granulocytes 1      NRBCs per 100 WBC 0      Absolute Neutrophils 5.6      Absolute Lymphocytes 1.2      Absolute Monocytes 0.5      Absolute Eosinophils 0.0      Absolute Basophils 0.1      Absolute Immature Granulocytes 0.1      Absolute NRBCs 0.0     ROUTINE UA WITH MICROSCOPIC REFLEX TO CULTURE - Abnormal    Color Urine Light Yellow      Appearance Urine Clear      Glucose Urine 30 (*)     Bilirubin Urine Negative      Ketones Urine 40 (*)     Specific Gravity Urine 1.011      Blood Urine Trace (*)     pH Urine 6.5      Protein Albumin Urine 70 (*)     Urobilinogen Urine Normal       Nitrite Urine Negative      Leukocyte Esterase Urine Negative      Mucus Urine Present (*)     RBC Urine 4 (*)     WBC Urine 2      Squamous Epithelials Urine <1     LACTIC ACID WHOLE BLOOD - Normal    Lactic Acid 1.7     INFLUENZA A/B, RSV, & SARS-COV2 PCR - Normal    Influenza A PCR Negative      Influenza B PCR Negative      RSV PCR Negative      SARS CoV2 PCR Negative     GLUCOSE CSF - Normal    Glucose CSF 59     PROTEIN TOTAL CSF - Normal    Protein total CSF 20.4     GROUP A STREPTOCOCCUS PCR THROAT SWAB - Normal    Group A strep by PCR Not Detected     RBC AND PLATELET MORPHOLOGY    RBC Morphology Confirmed RBC Indices      Platelet Assessment        Value: Automated Count Confirmed. Platelet morphology is normal.   LYME IGG AND IGM CSF IMMUNOBLOT   LYME DISEASE TOTAL ANTIBODIES WITH REFLEX TO CONFIRMATION   CELL COUNT CSF   BLOOD CULTURE   BLOOD CULTURE   AEROBIC BACTERIAL CULTURE ROUTINE   ANAEROBIC BACTERIAL CULTURE ROUTINE   MENINGITIS/ENCEPHALITIS PANEL QUAL PCR CSF   CELL COUNT WITH DIFFERENTIAL CSF       Imaging   XR Chest 2 Views   Final Result   IMPRESSION: Negative chest.      CT Head w/o Contrast   Final Result   IMPRESSION:   1.  No CT findings of acute intracranial process.          Independent Interpretation   Chest x-ray independently interpreted.  No infiltrate.    ED Course      Medications Administered   Medications   ibuprofen (ADVIL/MOTRIN) tablet 400 mg (400 mg Oral $Given 7/15/24 1622)   sodium chloride 0.9% BOLUS 3,537 mL (0 mLs Intravenous Stopped 7/15/24 2134)   acetaminophen (TYLENOL) tablet 1,000 mg (1,000 mg Oral $Given 7/15/24 2137)   sodium chloride 0.9% BOLUS 1,000 mL (1,000 mLs Intravenous $New Bag 7/15/24 2137)       Procedures   Procedures       Lumbar Puncture      Procedure: Lumbar Puncture      Indication: headache and fever     Consent: Written from Patient  Risks Discussed (including but not limited to): Infection, Bleeding, Spinal headache with possibility of spinal  patch, and Temporary or permanent neurological injury     Universal Protocol: Universal protocol was followed and time out conducted just prior to starting procedure, confirming patient identity, site/side, procedure, patient position, and availability of correct equipment and implants.      Anesthesia/Sedation: Lidocaine - 1%     Procedure Note:     Patient was placed in a sitting position.  The skin overlying the L4-5 area was prepped with povidone-iodine.    The patient was medicated as above.   A 20 gauge spinal needle was used to gain access to the subarachnoid space with stylet in place.   The fluid was clear.   Stylet was replaced and needle withdrawn.      Patient Status:  The patient tolerated the procedure well: Yes. There were no complications.      Medical Decision Making / Diagnosis     MARIELOS Linares is a 29 year old male who presents to the ED with a febrile illness over the course of the last 5 days.  No URI symptoms or gastrointestinal symptoms.  No rash.  The patient was seen in urgent care and was noted to have a headache.  COVID test were negative at home.  Flu test was negative earlier today, the patient was referred into the ED with concern for meningitis.    On my exam the patient does not have confusion or any nuchal rigidity.  He does have a fever.  No leukocytosis.  There is no other clear source for the fever and we discussed lumbar puncture.  Risks were discussed and the patient gave consent.  We are awaiting cell counts.  If the spinal fluid analysis is not consistent with meningitis this is most likely still a viral syndrome.  There is no evidence of pericarditis or other acute inflammatory condition, so the patient will be appropriate for outpatient management. This will be followed by Dr. Gomez    Diagnosis     ICD-10-CM    1. Febrile illness  R50.9       2. Myalgia  M79.10            Scribe Disclosure:  Jocelyn URBINA, am serving as a scribe at 6:38 PM on 7/15/2024 to  document services personally performed by Stanislaw Simon MD based on my observations and the provider's statements to me.        Stanislaw Simon MD  07/15/24 [id number]

## 2024-07-15 NOTE — Clinical Note
Barry Linares was seen and treated in our emergency department on 7/15/2024.  He may return to work on 07/22/2024.       If you have any questions or concerns, please don't hesitate to call.      Geovanni Gomez MD

## 2024-07-15 NOTE — PROGRESS NOTES
Assessment & Plan     Fever and chills  Headache  - Influenza A & B Antigen - Clinic Collect  - CBC with platelets and differential  - Basic metabolic panel  (Ca, Cl, CO2, Creat, Gluc, K, Na, BUN)  - acetaminophen (TYLENOL) tablet 500 mg  - CBC with platelets and differential  - Basic metabolic panel  (Ca, Cl, CO2, Creat, Gluc, K, Na, BUN)       Dose of Tylenol given in clinic. Given prolonged duration of both headache and fever in the setting of developing neck stiffness/discomfort will recommend ED evaluation. Discussed viral meningitis is high on differential but requires further testing to confirm. White count in normal range but does not effectively rule out bacterial meningitis. Advised [facility] for evaluation -- called triage team to inform them of patient's arrival.     Patient declined zofran at this time as nausea is manageable.       Bunny Rodriguez MD   [place] [facility]    Varun Reddy is a 29 year old male who presents to clinic today for the following health issues:  Chief Complaint   Patient presents with    Urgent Care     Pt presents with fever, severe headache X 5 days and muscle aches starting for the back of head and radiates down to the back X 3 days. Pt had 2 negative at home COVID tests.     HPI    Patient reports 4 days of headache increasing in intensity has had low-grade fevers that have persisted starting in the head and radiating down the neck towards the back area.  No others at home are currently sick.  Home COVID test become negative.  No cough symptoms.  No vomiting but has been nauseous.    No visual difficulties      There are no problems to display for this patient.      Current Outpatient Medications   Medication Sig Dispense Refill    buPROPion (WELLBUTRIN XL) 150 MG 24 hr tablet TAKE ONE TABLET BY MOUTH ONE TIME DAILY.  AFTER 1 WEEK CAN INCREASE TO 2 TABLETS (300MG) DAILY IF TOLERATED      Cholecalciferol (VITAMIN D3) 10 MCG/ML LIQD       LAGEVRIO 200 MG capsule        testosterone cypionate (DEPOTESTOSTERONE) 200 MG/ML injection Inject intramuscularly.       No current facility-administered medications for this visit.           Objective    /83   Pulse (!) 123   Temp (!) 103.3  F (39.6  C) (Tympanic)   Wt 117.2 kg (258 lb 4.8 oz)   SpO2 98%   Physical Exam       Pulm: non-labored  GEN: normal mentation, mild discomfort  Neck: Mild neck discomfort with movement although able to flex and extend    Results for orders placed or performed in visit on 07/15/24   CBC with platelets and differential     Status: None   Result Value Ref Range    WBC Count 8.6 4.0 - 11.0 10e3/uL    RBC Count 5.78 4.40 - 5.90 10e6/uL    Hemoglobin 17.3 13.3 - 17.7 g/dL    Hematocrit 52.6 40.0 - 53.0 %    MCV 91 78 - 100 fL    MCH 29.9 26.5 - 33.0 pg    MCHC 32.9 31.5 - 36.5 g/dL    RDW 13.9 10.0 - 15.0 %    Platelet Count 265 150 - 450 10e3/uL    % Neutrophils 80 %    % Lymphocytes 11 %    % Monocytes 9 %    % Eosinophils 0 %    % Basophils 0 %    % Immature Granulocytes 1 %    Absolute Neutrophils 6.8 1.6 - 8.3 10e3/uL    Absolute Lymphocytes 0.9 0.8 - 5.3 10e3/uL    Absolute Monocytes 0.8 0.0 - 1.3 10e3/uL    Absolute Eosinophils 0.0 0.0 - 0.7 10e3/uL    Absolute Basophils 0.0 0.0 - 0.2 10e3/uL    Absolute Immature Granulocytes 0.1 <=0.4 10e3/uL   Influenza A & B Antigen - Clinic Collect     Status: Normal    Specimen: Nose; Swab   Result Value Ref Range    Influenza A antigen Negative Negative    Influenza B antigen Negative Negative    Narrative    Test results must be correlated with clinical data. If necessary, results should be confirmed by a molecular assay or viral culture.   CBC with platelets and differential     Status: None    Narrative    The following orders were created for panel order CBC with platelets and differential.  Procedure                               Abnormality         Status                     ---------                               -----------         ------                      CBC with platelets and d...[[id number]]                      Final result                 Please view results for these tests on the individual orders.               The use of Dragon/PowerMic dictation services may have been used to construct the content in this note; any grammatical or spelling errors are non-intentional. Please contact the author of this note directly if you are in need of any clarification.

## 2024-07-16 NOTE — ED PROVIDER NOTES
Patient is a 29-year-old male who presents to the emergency department with 4 days of headache, fever, chills and bodyaches.  Was sent from urgent care for meningitis evaluation.  Patient was initially seen by my partner Dr. Simon, please see his note for further details.  I was asked to follow-up on patient's CFS results.  Cell count shows slightly elevated nucleated cells that are predominantly lymphocytes.  Only 2 red blood cells.  Possible viral meningitis.  On reevaluation patient feels much improved.  No significant neurological deficits.  No high risk travel or immunosuppression.  Discussed observation admission for symptomatic control versus going home with strict return precautions.  Patient would prefer to go home at this time.  We discussed reasons to return to the emergency department.  All questions were answered patient will be discharged home.      Labs Ordered and Resulted from Time of ED Arrival to Time of ED Departure   COMPREHENSIVE METABOLIC PANEL - Abnormal       Result Value    Sodium 131 (*)     Potassium 3.9      Carbon Dioxide (CO2) 20 (*)     Anion Gap 18 (*)     Urea Nitrogen 14.5      Creatinine 1.22 (*)     GFR Estimate 81      Calcium 9.7      Chloride 93 (*)     Glucose 96      Alkaline Phosphatase 154 (*)     AST 55 (*)     ALT 65      Protein Total 8.2      Albumin 4.4      Bilirubin Total 0.4     CBC WITH PLATELETS AND DIFFERENTIAL - Abnormal    WBC Count 7.5      RBC Count 6.01 (*)     Hemoglobin 17.9 (*)     Hematocrit 54.2 (*)     MCV 90      MCH 29.8      MCHC 33.0      RDW 14.2      Platelet Count 282      % Neutrophils 75      % Lymphocytes 16      % Monocytes 7      % Eosinophils 0      % Basophils 1      % Immature Granulocytes 1      NRBCs per 100 WBC 0      Absolute Neutrophils 5.6      Absolute Lymphocytes 1.2      Absolute Monocytes 0.5      Absolute Eosinophils 0.0      Absolute Basophils 0.1      Absolute Immature Granulocytes 0.1      Absolute NRBCs 0.0      ROUTINE UA WITH MICROSCOPIC REFLEX TO CULTURE - Abnormal    Color Urine Light Yellow      Appearance Urine Clear      Glucose Urine 30 (*)     Bilirubin Urine Negative      Ketones Urine 40 (*)     Specific Gravity Urine 1.011      Blood Urine Trace (*)     pH Urine 6.5      Protein Albumin Urine 70 (*)     Urobilinogen Urine Normal      Nitrite Urine Negative      Leukocyte Esterase Urine Negative      Mucus Urine Present (*)     RBC Urine 4 (*)     WBC Urine 2      Squamous Epithelials Urine <1     CELL COUNT CSF - Abnormal    Tube Number 4      Color Colorless      Clarity Clear      Total Nucleated Cells 19 (*)     RBC Count 2     LACTIC ACID WHOLE BLOOD - Normal    Lactic Acid 1.7     INFLUENZA A/B, RSV, & SARS-COV2 PCR - Normal    Influenza A PCR Negative      Influenza B PCR Negative      RSV PCR Negative      SARS CoV2 PCR Negative     GLUCOSE CSF - Normal    Glucose CSF 59     PROTEIN TOTAL CSF - Normal    Protein total CSF 20.4     GROUP A STREPTOCOCCUS PCR THROAT SWAB - Normal    Group A strep by PCR Not Detected     RBC AND PLATELET MORPHOLOGY    RBC Morphology Confirmed RBC Indices      Platelet Assessment        Value: Automated Count Confirmed. Platelet morphology is normal.   DIFFERENTIAL CSF    % Neutrophils 13      % Lymphocytes 76      % Monocytes/Macrophages 11     LYME IGG AND IGM CSF IMMUNOBLOT   LYME DISEASE TOTAL ANTIBODIES WITH REFLEX TO CONFIRMATION   AEROBIC BACTERIAL CULTURE ROUTINE    Gram Stain Result        Gram Stain Result No organisms seen     BLOOD CULTURE   BLOOD CULTURE   ANAEROBIC BACTERIAL CULTURE ROUTINE   MENINGITIS/ENCEPHALITIS PANEL QUAL PCR CSF   CELL COUNT WITH DIFFERENTIAL CSF         Geovanni Gomez MD  07/16/24 0009

## 2024-07-27 NOTE — ED PROVIDER NOTES
"  Emergency Department Note      History of Present Illness     Chief Complaint   Abdominal pain    HPI   Barry Linares is a 29 year old with a history of Hodgkin's lymphoma who presents to the ED today for evaluation of abdominal pain. The patient reports he's had progressively worsening central and lower abdominal pain for the past two days. He reports that the pain is worse when eating and he hasn't been able to sleep or work because of the pain. He also reports he hasn't had a bowel movement in a week. He mentions he started feeling nauseous around 1100 today. The patient denies any vomiting or urinary symptoms. He reports that he's had two bowel obstructions in the past that required surgery. He's also had an appendectomy. The patient reports that he's been recovering from viral meningitis and has been taking a significant amount of ibuprofen up until a week ago (last Friday).    Independent Historian   None    Review of External Notes   I reviewed Care Everywhere and updated Epic.     Past Medical History   Medical History and Problem List   Past Medical History:   Diagnosis Date    Anxiety     Asthma     Depressive disorder     Gender dysphoria     Hodgkin's lymphoma     Kidney stone      Medications   buPROPion (WELLBUTRIN XL) 150 MG 24 hr tablet  Cholecalciferol (VITAMIN D3) 10 MCG/ML LIQD  LAGEVRIO 200 MG capsule  testosterone cypionate (DEPOTESTOSTERONE) 200 MG/ML injection    Surgical History   Past Surgical History:   Procedure Laterality Date    APPENDECTOMY      Bone marrow transplant      HERNIA REPAIR      HYSTERECTOMY         Physical Exam   Patient Vitals for the past 24 hrs:   BP Temp Pulse Resp SpO2 Height Weight   07/27/24 0105 137/90 97.6  F (36.4  C) 115 18 98 % 1.702 m (5' 7\") 112.9 kg (248 lb 14.4 oz)     Physical Exam  Nursing note and vitals reviewed.  Constitutional: Cooperative.   HENT:   Mouth/Throat: Mucous membranes are normal.   Cardiovascular: Normal rate, regular rhythm and normal " heart sounds.  No murmur.  Pulmonary/Chest: Effort normal and breath sounds normal. No respiratory distress. No wheezes. No rales.   Abdominal: Soft. Normal appearance and bowel sounds are normal. No distension. There is no rigidity and no guarding. Mild diffuse tenderness central to epigastric.  Neurological: Alert.  Oriented x 3  Skin: Skin is warm and dry.    Psychiatric: Normal mood and affect.     Diagnostics   Lab Results   Labs Ordered and Resulted from Time of ED Arrival to Time of ED Departure   BASIC METABOLIC PANEL - Abnormal       Result Value    Sodium 136      Potassium 4.2      Chloride 99      Carbon Dioxide (CO2) 23      Anion Gap 14      Urea Nitrogen 10.8      Creatinine 1.16      GFR Estimate 86      Calcium 10.1      Glucose 106 (*)    CBC WITH PLATELETS AND DIFFERENTIAL - Abnormal    WBC Count 15.0 (*)     RBC Count 5.67      Hemoglobin 16.8      Hematocrit 51.0      MCV 90      MCH 29.6      MCHC 32.9      RDW 14.1      Platelet Count 556 (*)     % Neutrophils 69      % Lymphocytes 20      % Monocytes 9      % Eosinophils 1      % Basophils 1      % Immature Granulocytes 1      NRBCs per 100 WBC 0      Absolute Neutrophils 10.3 (*)     Absolute Lymphocytes 3.1      Absolute Monocytes 1.4 (*)     Absolute Eosinophils 0.1      Absolute Basophils 0.1      Absolute Immature Granulocytes 0.1      Absolute NRBCs 0.0     HEPATIC FUNCTION PANEL - Normal    Protein Total 8.2      Albumin 4.6      Bilirubin Total 0.8      Alkaline Phosphatase 119      AST 28      ALT 39      Bilirubin Direct <0.20     LIPASE - Normal    Lipase 26       Imaging   CT Abdomen Pelvis w Contrast   Final Result   IMPRESSION:       1.  Moderate wall thickening of the proximal duodenum compatible with duodenitis.      2.  Large nonobstructing stones in the lower pole of the left kidney measuring up to 8 mm.        Independent Interpretation   None    ED Course    Medications Administered   Medications   sodium chloride 0.9%  BOLUS 1,000 mL (1,000 mLs Intravenous $New Bag 7/27/24 0301)   ondansetron (ZOFRAN) injection 4 mg (4 mg Intravenous $Given 7/27/24 0308)   pantoprazole (PROTONIX) IV push injection 40 mg (40 mg Intravenous $Given 7/27/24 0309)     Discussion of Management   None    ED Course   ED Course as of 07/27/24 0318   Sat Jul 27, 2024   0207 I obtained history and examined the patient as noted above.    0312 I rechecked and updated the patient.      Optional/Additional Documentation  None    Medical Decision Making / Diagnosis     [facility]   Barry Linares is a 29 year old who presents with centralized abdominal pain.  Workup here consistent with duodenitis.  On further review of recent history, Barry was taking large amounts of ibuprofen while recovering from meningitis.  I am most concerned for NSAID induced gastritis versus ulcer.  Proton pump inhibitor therapy started with sucralfate to use as needed.  Outpatient upper endoscopy scheduled through the [facility] ordering system.  No evidence of perforation or other acute surgical emergency.  No evidence of bowel obstruction    Disposition   The patient was discharged.     Diagnosis     ICD-10-CM   1. Duodenitis  K29.80      2. NSAID induced gastritis  K29.60    T39.395A           Discharge Medications   New Prescriptions    PANTOPRAZOLE (PROTONIX) 40 MG EC TABLET    Take 1 tablet (40 mg) by mouth daily    SUCRALFATE (CARAFATE) 1 GM/10ML SUSPENSION    Take 10 mLs (1 g) by mouth 4 times daily         Scribe Disclosure:  IBlaire, am serving as a scribe at 2:00 AM on 7/27/2024 to document services personally performed by Senthil Dunn MD based on my observations and the provider's statements to me.        Senthil Dunn MD  07/27/24 [id number]

## 2024-07-27 NOTE — ED TRIAGE NOTES
Pt presents to triage with c/o midline abdominal pain. Hx SBO, states feels similar. Hasn't had full BM in a week. Nausea, no vomiting. Not passing gas.